# Patient Record
Sex: FEMALE | Race: WHITE
[De-identification: names, ages, dates, MRNs, and addresses within clinical notes are randomized per-mention and may not be internally consistent; named-entity substitution may affect disease eponyms.]

---

## 2019-02-04 ENCOUNTER — HOSPITAL ENCOUNTER (OUTPATIENT)
Dept: HOSPITAL 92 - BICMAMMO | Age: 54
Discharge: HOME | End: 2019-02-04
Attending: FAMILY MEDICINE
Payer: COMMERCIAL

## 2019-02-04 DIAGNOSIS — Z85.820: ICD-10-CM

## 2019-02-04 DIAGNOSIS — Z12.31: Primary | ICD-10-CM

## 2019-02-04 DIAGNOSIS — R92.8: ICD-10-CM

## 2019-02-04 PROCEDURE — 77067 SCR MAMMO BI INCL CAD: CPT

## 2019-02-04 PROCEDURE — 77063 BREAST TOMOSYNTHESIS BI: CPT

## 2019-02-07 ENCOUNTER — HOSPITAL ENCOUNTER (OUTPATIENT)
Dept: HOSPITAL 92 - BICMAMMO | Age: 54
Discharge: HOME | End: 2019-02-07
Attending: FAMILY MEDICINE
Payer: COMMERCIAL

## 2019-02-07 DIAGNOSIS — Z85.820: ICD-10-CM

## 2019-02-07 DIAGNOSIS — N63.20: Primary | ICD-10-CM

## 2019-02-07 PROCEDURE — G0279 TOMOSYNTHESIS, MAMMO: HCPCS

## 2019-02-07 NOTE — ULT
LEFT BREAST ULTRASOUND

2/7/19

 

COMPARISON:  

Mammogram 2/7/19, 2/4/19.

 

HISTORY: 

Architectural distortion seen in the 12 o'clock position of the left breast on screening mammography 
and diagnostic mammography. 

 

TECHNIQUE:  

Multiplanar gray scale and color doppler images were obtained in a targeted ultrasound of the left br
east. 

 

FINDINGS:  

No suspicious shadowing or architectural distortion are seen in the left breast. No solid masses are 
seen. 

 

IMPRESSION:  

The area of architectural distortion was not able to be seen with ultrasound. BIRADS 0: Incomplete:  
Need Additional Imaging Evaluation and/or Prior Mammograms for Comparison

 

An MRI is recommended to evaluate for abnormal enhancement in this region of potential architectural 
distortion. 

 

POS: RAD

## 2019-02-25 ENCOUNTER — HOSPITAL ENCOUNTER (OUTPATIENT)
Dept: HOSPITAL 92 - BICMRI | Age: 54
Discharge: HOME | End: 2019-02-25
Attending: FAMILY MEDICINE
Payer: COMMERCIAL

## 2019-02-25 DIAGNOSIS — R92.8: Primary | ICD-10-CM

## 2019-02-25 LAB — ESTIMATED GFR-MDRD - POC: 88

## 2019-02-25 PROCEDURE — 82565 ASSAY OF CREATININE: CPT

## 2019-02-25 PROCEDURE — A9577 INJ MULTIHANCE: HCPCS

## 2019-02-25 PROCEDURE — C8908 MRI W/O FOL W/CONT, BREAST,: HCPCS

## 2019-02-25 NOTE — MRI
MRI BILATERAL BREASTS WITH AND WITHOUT CONTRAST:

 

COMPARISON:

Mammogram and ultrasound from 02/07/2019.

Mammograms from 02/04/2019 and 02/01/2018.

 

TECHNIQUE:

Multiplanar, multisequence MR images were obtained of the bilateral breasts with and without IV contr
ast.  This exam is evaluated on the [facility] work station, and 3D MIP reformats and contrast-enhanced 
curves were generated on the [facility] work station.

 

FINDINGS:

Scattered fibroglandular breast tissue is present.  No significant background parenchymal enhancement
 is seen.  In the central aspect of the left breast, there is a 5 mm area of high T2 signal and enhan
cement.  This enhancement is type II plateau-type enhancement and corresponds to the mammographic abn
ormality.  This lesion is actually closer to the medial aspect of the breast, at approximately the 9 
o'clock position rather than the 12 o'clock position, where the ultrasound was performed.

 

No other abnormal area of enhancement is seen in either breast.  No axillary adenopathy is seen.  No 
internal mammary lymph nodes are identified.

 

The visualized anterior liver and osseous structures are unremarkable.

 

IMPRESSION:

There is a suspicious enhancing nodule in the left breast, which corresponds to the mammographic abno
rmality.  BI-RADS category 4-Suspicious abnormality.  A biopsy is recommended.  A second look ultraso
und at the 9 o'clock position of the left breast should be performed to evaluate for this 5 mm mass. 
 If this lesion cannot be seen with seen with a second look ultrasound, then an MRI guided biopsy jami
l need to be performed.

 

POS: RAD

## 2020-10-01 ENCOUNTER — HOSPITAL ENCOUNTER (OUTPATIENT)
Dept: HOSPITAL 92 - LABBT | Age: 55
Discharge: HOME | End: 2020-10-01
Attending: OBSTETRICS & GYNECOLOGY
Payer: COMMERCIAL

## 2020-10-01 DIAGNOSIS — Z85.3: ICD-10-CM

## 2020-10-01 DIAGNOSIS — Z01.812: Primary | ICD-10-CM

## 2020-10-01 DIAGNOSIS — Z20.828: ICD-10-CM

## 2020-10-01 DIAGNOSIS — N92.0: ICD-10-CM

## 2020-10-01 LAB
HGB BLD-MCNC: 13.7 G/DL (ref 12–16)
MCH RBC QN AUTO: 28.4 PG (ref 27–31)
MCV RBC AUTO: 88.3 FL (ref 78–98)
PLATELET # BLD AUTO: 351 THOU/UL (ref 130–400)
RBC # BLD AUTO: 4.82 MILL/UL (ref 4.2–5.4)
WBC # BLD AUTO: 5.7 THOU/UL (ref 4.8–10.8)

## 2020-10-01 PROCEDURE — 85027 COMPLETE CBC AUTOMATED: CPT

## 2020-10-01 PROCEDURE — 87635 SARS-COV-2 COVID-19 AMP PRB: CPT

## 2020-10-01 PROCEDURE — U0003 INFECTIOUS AGENT DETECTION BY NUCLEIC ACID (DNA OR RNA); SEVERE ACUTE RESPIRATORY SYNDROME CORONAVIRUS 2 (SARS-COV-2) (CORONAVIRUS DISEASE [COVID-19]), AMPLIFIED PROBE TECHNIQUE, MAKING USE OF HIGH THROUGHPUT TECHNOLOGIES AS DESCRIBED BY CMS-2020-01-R: HCPCS

## 2020-10-05 ENCOUNTER — HOSPITAL ENCOUNTER (OUTPATIENT)
Dept: HOSPITAL 92 - SDC | Age: 55
Discharge: HOME | End: 2020-10-05
Attending: OBSTETRICS & GYNECOLOGY
Payer: COMMERCIAL

## 2020-10-05 VITALS — BODY MASS INDEX: 45.4 KG/M2

## 2020-10-05 DIAGNOSIS — G47.30: ICD-10-CM

## 2020-10-05 DIAGNOSIS — I10: ICD-10-CM

## 2020-10-05 DIAGNOSIS — N83.01: ICD-10-CM

## 2020-10-05 DIAGNOSIS — Z85.3: ICD-10-CM

## 2020-10-05 DIAGNOSIS — N83.02: ICD-10-CM

## 2020-10-05 DIAGNOSIS — N84.0: Primary | ICD-10-CM

## 2020-10-05 DIAGNOSIS — E66.9: ICD-10-CM

## 2020-10-05 DIAGNOSIS — F41.9: ICD-10-CM

## 2020-10-05 PROCEDURE — 86900 BLOOD TYPING SEROLOGIC ABO: CPT

## 2020-10-05 PROCEDURE — 0UT94ZZ RESECTION OF UTERUS, PERCUTANEOUS ENDOSCOPIC APPROACH: ICD-10-PCS | Performed by: OBSTETRICS & GYNECOLOGY

## 2020-10-05 PROCEDURE — 93005 ELECTROCARDIOGRAM TRACING: CPT

## 2020-10-05 PROCEDURE — 93010 ELECTROCARDIOGRAM REPORT: CPT

## 2020-10-05 PROCEDURE — 88307 TISSUE EXAM BY PATHOLOGIST: CPT

## 2020-10-05 PROCEDURE — 0UT74ZZ RESECTION OF BILATERAL FALLOPIAN TUBES, PERCUTANEOUS ENDOSCOPIC APPROACH: ICD-10-PCS | Performed by: OBSTETRICS & GYNECOLOGY

## 2020-10-05 PROCEDURE — 86901 BLOOD TYPING SEROLOGIC RH(D): CPT

## 2020-10-05 PROCEDURE — 86850 RBC ANTIBODY SCREEN: CPT

## 2020-10-05 PROCEDURE — 0UT24ZZ RESECTION OF BILATERAL OVARIES, PERCUTANEOUS ENDOSCOPIC APPROACH: ICD-10-PCS | Performed by: OBSTETRICS & GYNECOLOGY

## 2020-10-05 PROCEDURE — S0028 INJECTION, FAMOTIDINE, 20 MG: HCPCS

## 2020-10-05 NOTE — OP
DATE OF PROCEDURE:  10/05/2020



PREOPERATIVE DIAGNOSES:  

1. Personal history of breast cancer.

2. History of menorrhagia.



POSTOPERATIVE DIAGNOSES:  

1. Personal history of breast cancer.

2. History of menorrhagia.



PROCEDURES PERFORMED:  Robotic-assisted total laparoscopic hysterectomy with

bilateral salpingo-oophorectomy and ON-Q pump placement. 



ASSISTANT:  Christy Terrell PA-C.



COMPLICATIONS:  None.



ESTIMATED BLOOD LOSS:  75 mL.



ANESTHESIA:  GETA.



OPERATIVE FINDINGS:  

1. Obesity.

2. Normal-appearing cervix and vaginal mucosa at the start of the case.

3. Enlarged uterus with normal fallopian tubes, and bilaterally normal-appearing

ovaries. 

4. Surgical cuff hemostatic.

5. Small 1.5 cm perineal laceration at the vaginal mucosa repaired at the end of the

case. 



PROCEDURE IN DETAIL:  The patient was taken back to the OR with IV fluids running.

Once she was in the OR, general anesthesia was obtained.  The patient was placed in

dorsal supine position with the arms tucked at her side and her legs in low dorsal

lithotomy position.  The abdomen and vagina were prepped and draped in normal

fashion for a laparoscopic hysterectomy.  Surgeons were gowned and gloved.  The

bladder was drained and attached to a Luis Angel syringe for bladder manipulation during

the case.  An operative speculum was placed into the vagina with the cervix easily

identified and grasped with a single-tooth tenaculum.  The uterine sound was passed

through the cervix and the uterus sounded to 8 cm.  A [employer] manipulator was

assembled with a 4 cm cup and an 8 cm tip and placed into the uterus and vagina in

routine fashion for hysterectomy.  The surgeon's gloves were changed and the

attention was turned to the laparoscopic portion of the case.  Beginning at the

supraumbilical fold, local anesthesia was placed underneath the skin.  A 12 mm skin

incision was made with a scalpel.  The subcutaneous tissue was bluntly dissected

with a hemostat and a Veress needle was placed through this incision into the

peritoneal cavity.  Once the peritoneum was reached, it was insufflated without

difficulty.  The Veress needle was then removed.  A 12 mm trocar was placed through

the distended abdomen through the 12 mm skin incision without difficulty.  The

laparoscope was then placed through this trocar with the above findings noted.  The

patient was then placed in Trendelenburg position and in similar fashion, an 8 mm

right lower quadrant and left lower quadrant ports were placed, a right upper

quadrant 11 mm port was placed all under direct visualization without difficulty.

When all 4 ports were placed, the robot was docked to the patient's bedside.  The

instruments were placed under direct visualization through the trocars and the

anatomy was inspected.  Beginning on the patient's left side, the left adnexa was

grasped and elevated away from the pelvic sidewall.  The left ureter was noted to be

running away from the IP ligament.  The IP ligament was cauterized and transected

using a combination of bipolar and monopolar cautery on the patient's right side.

The left ovary and the left tube were transected from the left pelvic sidewall and

remained attached to the uterus.  Once the ovary and fallopian tube were dissected

away from the pelvic sidewall, the left round ligament was cauterized and

transected.  The round ligament on the patient's left side was then dissected down

through the broad ligament to the level of the uterine artery.  The bladder was

backfilled and the bladder flap was created using fine dissection technique with the

bladder dissected away from the cervix.  The uterine artery was further cauterized.

It was then transected after the blood supply was controlled on the patient's left

side.  Attention was turned to the contralateral side.  In similar fashion, the

right fallopian tube and ovary were cauterized and transected allowing them to fall

away toward the uterus and away from the pelvic sidewall.  The right ureter was

easily identified and noted to be away from the planned surgical sites.  The round

ligament on the patient's right side was cauterized, transected, and divided into

anterior and posterior leaves.  It was dissected down towards the level of the

uterine artery as well.  The bladder flap was completed from the patient's right

side towards the midline and the bladder was completely dissected away from the

planned hysterotomy site.  The cervicovesical fascia was dissected as needed to

ensure bladder safety for the colpotomy.  The uterine artery on the patient's right

side was cauterized and transected.  The colpotomy was performed circumferentially

using monopolar scissors.  The cervix, uterus, tubes, and ovaries specimen was then

retracted into the vagina.  The surgical sites and the vaginal cuff were copiously

irrigated and dried.  Any small areas of bleeding around the vaginal cuff were

controlled with bipolar cautery.  A Stratafix suture was placed under direct

visualization into the pelvis and the vagina was closed in a running fashion and in

2 layers.  Once the vaginal cuff closure was complete, the vaginal cuff and surgical

pedicles were again irrigated and suctioned dry.  No areas of bleeding were noted.

The pressure was dropped down to 8 mmHg with no evidence of bleeding noted.  An ON-Q

catheter tip was placed through the subcutaneous tissue and drawn down into the

pelvis.  It was primed with Marcaine.  The trocars were then removed.  The counts

were correct and the gas was released from the abdomen.  The supraumbilical port

site was closed at the fascial layer with Vicryl suture.  All 4 skin incisions were

closed with Monocryl suture and dressed with Dermabond dressing.  At the end of the

case, the vagina was inspected with a small area of bleeding noted just at the

introitus in the midline of the perineum, it was inspected and a small tear in the

vaginal mucosa was noted.  This tear was inherent to the procedure and occurred

during removal of the enlarged uterine specimen from the vagina.  A chromic suture

was used in a running fashion to reapproximate this approximate 1.5 cm vaginal

mucosal tear.  Hemostasis was noted after reapproximation of the tissue.  The

patient was then cleaned, dried, extubated, and transferred to recovery room in good

condition.  The counts were correct.  There were no complications. 







Job ID:  [id number]

## 2020-10-05 NOTE — EKG
Test Reason : PREOP

Blood Pressure : ***/*** mmHG

Vent. Rate : 073 BPM     Atrial Rate : 073 BPM

   P-R Int : 166 ms          QRS Dur : 088 ms

    QT Int : 400 ms       P-R-T Axes : [phone] degrees

   QTc Int : 440 ms

 

Normal sinus rhythm

Normal ECG

 

Confirmed by DR. CONSTANCE CHRISTOPHER ([id number]) on 10/5/2020 6:03:23 PM

 

Referred By:  [facility]           Confirmed By:DR. CONSTANCE CHRISTOPHER